# Patient Record
Sex: MALE | Race: WHITE | NOT HISPANIC OR LATINO | URBAN - METROPOLITAN AREA
[De-identification: names, ages, dates, MRNs, and addresses within clinical notes are randomized per-mention and may not be internally consistent; named-entity substitution may affect disease eponyms.]

---

## 2021-10-25 ENCOUNTER — OFFICE (OUTPATIENT)
Dept: URBAN - METROPOLITAN AREA CLINIC 9 | Facility: CLINIC | Age: 31
End: 2021-10-25

## 2021-10-25 VITALS
DIASTOLIC BLOOD PRESSURE: 81 MMHG | SYSTOLIC BLOOD PRESSURE: 130 MMHG | OXYGEN SATURATION: 99 % | WEIGHT: 222 LBS | HEART RATE: 76 BPM | HEIGHT: 72 IN

## 2021-10-25 DIAGNOSIS — K21.9 GASTRO-ESOPHAGEAL REFLUX DISEASE WITHOUT ESOPHAGITIS: ICD-10-CM

## 2021-10-25 DIAGNOSIS — R10.11 RIGHT UPPER QUADRANT PAIN: ICD-10-CM

## 2021-10-25 DIAGNOSIS — R74.8 ABNORMAL LEVELS OF OTHER SERUM ENZYMES: ICD-10-CM

## 2021-10-25 DIAGNOSIS — Z86.16 PERSONAL HISTORY OF COVID-19: ICD-10-CM

## 2021-10-25 PROCEDURE — 99203 OFFICE O/P NEW LOW 30 MIN: CPT | Performed by: SPECIALIST

## 2021-10-25 NOTE — SERVICEHPINOTES
Mr. Chávez is a pleasant 31-year-old  male with a PMH significant for reflux, newly found hiatal hernia on EGD, and hypercholesterolemia.  He was referred from his PCP, Dr. Medrano  for elevation of liver enzymes and right upper quadrant abdominal pain.  Onset of symptoms was August 2021 when he was diagnosed with COVID-19 infection.   He states around the time he is diagnosed, he started experiencing midsternal chest pain radiating to his right upper quadrant.  Described as a dull aching sensation intermittent.   Also with increase in reflux sx and belching. No relation to p.o. intake.  No relieving or exacerbating factors.  States the pain would go away with time.  Denies any NSAID use.  No nausea, vomiting.    Late August he was on vacation in [place] and started having midsternal chest pain and right upper quadrant pain, presented to the ED and chest x-ray and D-dimer okay, but did note that his liver enzymes were elevated.  Cardiac of bowel at that time ruled out any acute findings.
br
br PCP started Prilosec 40 milligrams p.o. daily for heartburn and reflux, which has helped him.  On occasion has some breakthrough symptoms depending on what he eats.  No history of elevated liver enzymes in the past prior to COVID-19 infection.  he has had full workup in [facility] by gastroenterologist, Dr. Armas.  States he underwent an EGD which showed hiatal hernia and reflux changes.  HIDA scan with reportedly gallbladder EF 40 percent, abdominal ultrasound reportedly okay.  Working on obtaining results.  He states next he has scheduled a fibroscan for possible fatty liver.  He is also seeing cardiologist, Dr. Sumeet Harrell, and has plans for a stress test this afternoon.   No alcohol or tobacco use.  No known family history of colon cancer, stomach cancer, primary liver cancer.

## 2021-10-25 NOTE — SERVICENOTES
Patient seen and examined with Fatmata De Leon NP.  Will review records and will follow up on  Fibroscan and MRI ordered by Dr. Armas in [place].

## 2023-05-24 ENCOUNTER — OFFICE (OUTPATIENT)
Dept: URBAN - METROPOLITAN AREA CLINIC 19 | Facility: CLINIC | Age: 33
End: 2023-05-24

## 2023-05-24 VITALS
HEART RATE: 80 BPM | DIASTOLIC BLOOD PRESSURE: 88 MMHG | WEIGHT: 235 LBS | OXYGEN SATURATION: 99 % | SYSTOLIC BLOOD PRESSURE: 140 MMHG | HEIGHT: 72 IN

## 2023-05-24 DIAGNOSIS — K21.9 GASTRO-ESOPHAGEAL REFLUX DISEASE WITHOUT ESOPHAGITIS: ICD-10-CM

## 2023-05-24 DIAGNOSIS — R10.11 RIGHT UPPER QUADRANT PAIN: ICD-10-CM

## 2023-05-24 PROCEDURE — 99214 OFFICE O/P EST MOD 30 MIN: CPT | Performed by: INTERNAL MEDICINE

## 2023-05-24 RX ORDER — OMEPRAZOLE 40 MG/1
40 CAPSULE, DELAYED RELEASE ORAL
Qty: 90 | Refills: 3 | Status: ACTIVE

## 2023-05-24 RX ORDER — DICYCLOMINE HYDROCHLORIDE 10 MG/1
CAPSULE ORAL
Qty: 30 | Refills: 3 | Status: ACTIVE
Start: 2023-05-24

## 2023-05-26 LAB
IMMUNOGLOBULIN A, QN, SERUM: 195 MG/DL (ref 90–386)
T-TRANSGLUTAMINASE (TTG) IGA: <2 U/ML

## 2023-08-28 ENCOUNTER — OFFICE (OUTPATIENT)
Dept: URBAN - METROPOLITAN AREA CLINIC 19 | Facility: CLINIC | Age: 33
End: 2023-08-28

## 2023-08-28 VITALS
HEIGHT: 72 IN | DIASTOLIC BLOOD PRESSURE: 84 MMHG | OXYGEN SATURATION: 100 % | WEIGHT: 233 LBS | HEART RATE: 72 BPM | SYSTOLIC BLOOD PRESSURE: 152 MMHG

## 2023-08-28 DIAGNOSIS — R10.11 RIGHT UPPER QUADRANT PAIN: ICD-10-CM

## 2023-08-28 DIAGNOSIS — K21.9 GASTRO-ESOPHAGEAL REFLUX DISEASE WITHOUT ESOPHAGITIS: ICD-10-CM

## 2023-08-28 DIAGNOSIS — R10.13 EPIGASTRIC PAIN: ICD-10-CM

## 2023-08-28 DIAGNOSIS — R19.7 DIARRHEA, UNSPECIFIED: ICD-10-CM

## 2023-08-28 PROCEDURE — 99214 OFFICE O/P EST MOD 30 MIN: CPT | Performed by: NURSE PRACTITIONER

## 2023-08-28 RX ORDER — SODIUM PICOSULFATE, MAGNESIUM OXIDE, AND ANHYDROUS CITRIC ACID 10; 3.5; 12 MG/160ML; G/160ML; G/160ML
LIQUID ORAL
Qty: 350 | Refills: 0 | Status: ACTIVE
Start: 2023-08-28